# Patient Record
Sex: FEMALE | Race: OTHER | URBAN - METROPOLITAN AREA
[De-identification: names, ages, dates, MRNs, and addresses within clinical notes are randomized per-mention and may not be internally consistent; named-entity substitution may affect disease eponyms.]

---

## 2023-06-07 ENCOUNTER — INPATIENT (INPATIENT)
Facility: HOSPITAL | Age: 28
LOS: 2 days | Discharge: HOME HEALTH | DRG: 482 | End: 2023-06-10
Attending: ORTHOPAEDIC SURGERY | Admitting: ORTHOPAEDIC SURGERY
Payer: COMMERCIAL

## 2023-06-07 VITALS
TEMPERATURE: 98 F | OXYGEN SATURATION: 99 % | WEIGHT: 160.06 LBS | RESPIRATION RATE: 18 BRPM | SYSTOLIC BLOOD PRESSURE: 109 MMHG | HEIGHT: 68 IN | DIASTOLIC BLOOD PRESSURE: 65 MMHG | HEART RATE: 64 BPM

## 2023-06-07 DIAGNOSIS — S72.141A DISPLACED INTERTROCHANTERIC FRACTURE OF RIGHT FEMUR, INITIAL ENCOUNTER FOR CLOSED FRACTURE: ICD-10-CM

## 2023-06-07 DIAGNOSIS — F41.9 ANXIETY DISORDER, UNSPECIFIED: ICD-10-CM

## 2023-06-07 DIAGNOSIS — D72.829 ELEVATED WHITE BLOOD CELL COUNT, UNSPECIFIED: ICD-10-CM

## 2023-06-07 DIAGNOSIS — G43.909 MIGRAINE, UNSPECIFIED, NOT INTRACTABLE, WITHOUT STATUS MIGRAINOSUS: ICD-10-CM

## 2023-06-07 DIAGNOSIS — F32.A DEPRESSION, UNSPECIFIED: ICD-10-CM
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
## 2023-06-07 DIAGNOSIS — S72.001A FRACTURE OF UNSPECIFIED PART OF NECK OF RIGHT FEMUR, INITIAL ENCOUNTER FOR CLOSED FRACTURE: ICD-10-CM
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
## 2023-06-07 DIAGNOSIS — E28.2 POLYCYSTIC OVARIAN SYNDROME: ICD-10-CM
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
## 2023-06-07 DIAGNOSIS — E55.9 VITAMIN D DEFICIENCY, UNSPECIFIED: ICD-10-CM

## 2023-06-07 LAB
ANION GAP SERPL CALC-SCNC: 8 MMOL/L — SIGNIFICANT CHANGE UP (ref 5–17)
APTT BLD: 30.6 SEC — SIGNIFICANT CHANGE UP (ref 27.5–35.5)
BASOPHILS # BLD AUTO: 0.04 K/UL — SIGNIFICANT CHANGE UP (ref 0–0.2)
BASOPHILS NFR BLD AUTO: 0.4 % — SIGNIFICANT CHANGE UP (ref 0–2)
BLD GP AB SCN SERPL QL: NEGATIVE — SIGNIFICANT CHANGE UP
BLD GP AB SCN SERPL QL: NEGATIVE — SIGNIFICANT CHANGE UP
BUN SERPL-MCNC: 19 MG/DL — SIGNIFICANT CHANGE UP (ref 7–23)
CALCIUM SERPL-MCNC: 9.5 MG/DL — SIGNIFICANT CHANGE UP (ref 8.4–10.5)
CHLORIDE SERPL-SCNC: 106 MMOL/L — SIGNIFICANT CHANGE UP (ref 96–108)
CO2 SERPL-SCNC: 26 MMOL/L — SIGNIFICANT CHANGE UP (ref 22–31)
CREAT SERPL-MCNC: 0.67 MG/DL — SIGNIFICANT CHANGE UP (ref 0.5–1.3)
EGFR: 122 ML/MIN/1.73M2 — SIGNIFICANT CHANGE UP
EOSINOPHIL # BLD AUTO: 0.21 K/UL — SIGNIFICANT CHANGE UP (ref 0–0.5)
EOSINOPHIL NFR BLD AUTO: 1.9 % — SIGNIFICANT CHANGE UP (ref 0–6)
GLUCOSE SERPL-MCNC: 81 MG/DL — SIGNIFICANT CHANGE UP (ref 70–99)
HCG SERPL-ACNC: <0 MIU/ML — SIGNIFICANT CHANGE UP
HCG UR QL: NEGATIVE — SIGNIFICANT CHANGE UP
HCT VFR BLD CALC: 41.1 % — SIGNIFICANT CHANGE UP (ref 34.5–45)
HGB BLD-MCNC: 13.9 G/DL — SIGNIFICANT CHANGE UP (ref 11.5–15.5)
IMM GRANULOCYTES NFR BLD AUTO: 0.2 % — SIGNIFICANT CHANGE UP (ref 0–0.9)
INR BLD: 0.93 — SIGNIFICANT CHANGE UP (ref 0.88–1.16)
LYMPHOCYTES # BLD AUTO: 2.55 K/UL — SIGNIFICANT CHANGE UP (ref 1–3.3)
LYMPHOCYTES # BLD AUTO: 23 % — SIGNIFICANT CHANGE UP (ref 13–44)
MCHC RBC-ENTMCNC: 31.6 PG — SIGNIFICANT CHANGE UP (ref 27–34)
MCHC RBC-ENTMCNC: 33.8 GM/DL — SIGNIFICANT CHANGE UP (ref 32–36)
MCV RBC AUTO: 93.4 FL — SIGNIFICANT CHANGE UP (ref 80–100)
MONOCYTES # BLD AUTO: 0.57 K/UL — SIGNIFICANT CHANGE UP (ref 0–0.9)
MONOCYTES NFR BLD AUTO: 5.1 % — SIGNIFICANT CHANGE UP (ref 2–14)
NEUTROPHILS # BLD AUTO: 7.7 K/UL — HIGH (ref 1.8–7.4)
NEUTROPHILS NFR BLD AUTO: 69.4 % — SIGNIFICANT CHANGE UP (ref 43–77)
NRBC # BLD: 0 /100 WBCS — SIGNIFICANT CHANGE UP (ref 0–0)
PLATELET # BLD AUTO: 269 K/UL — SIGNIFICANT CHANGE UP (ref 150–400)
POTASSIUM SERPL-MCNC: 3.8 MMOL/L — SIGNIFICANT CHANGE UP (ref 3.5–5.3)
POTASSIUM SERPL-SCNC: 3.8 MMOL/L — SIGNIFICANT CHANGE UP (ref 3.5–5.3)
PROTHROM AB SERPL-ACNC: 11.1 SEC — SIGNIFICANT CHANGE UP (ref 10.5–13.4)
RBC # BLD: 4.4 M/UL — SIGNIFICANT CHANGE UP (ref 3.8–5.2)
RBC # FLD: 11.8 % — SIGNIFICANT CHANGE UP (ref 10.3–14.5)
RH IG SCN BLD-IMP: POSITIVE — SIGNIFICANT CHANGE UP
RH IG SCN BLD-IMP: POSITIVE — SIGNIFICANT CHANGE UP
SODIUM SERPL-SCNC: 140 MMOL/L — SIGNIFICANT CHANGE UP (ref 135–145)
WBC # BLD: 11.09 K/UL — HIGH (ref 3.8–10.5)
WBC # FLD AUTO: 11.09 K/UL — HIGH (ref 3.8–10.5)

## 2023-06-07 PROCEDURE — 71045 X-RAY EXAM CHEST 1 VIEW: CPT | Mod: 26

## 2023-06-07 PROCEDURE — 99285 EMERGENCY DEPT VISIT HI MDM: CPT

## 2023-06-07 PROCEDURE — 73552 X-RAY EXAM OF FEMUR 2/>: CPT | Mod: 26,RT

## 2023-06-07 PROCEDURE — 99252 IP/OBS CONSLTJ NEW/EST SF 35: CPT

## 2023-06-07 PROCEDURE — 73502 X-RAY EXAM HIP UNI 2-3 VIEWS: CPT | Mod: 26,RT

## 2023-06-07 RX ORDER — VERAPAMIL HCL 240 MG
180 CAPSULE, EXTENDED RELEASE PELLETS 24 HR ORAL
Refills: 0 | DISCHARGE

## 2023-06-07 RX ORDER — POVIDONE-IODINE 5 %
1 AEROSOL (ML) TOPICAL ONCE
Refills: 0 | Status: COMPLETED | OUTPATIENT
Start: 2023-06-08 | End: 2023-06-08

## 2023-06-07 RX ORDER — VERAPAMIL HCL 240 MG
180 CAPSULE, EXTENDED RELEASE PELLETS 24 HR ORAL AT BEDTIME
Refills: 0 | Status: DISCONTINUED | OUTPATIENT
Start: 2023-06-07 | End: 2023-06-10

## 2023-06-07 RX ORDER — SERTRALINE 25 MG/1
1 TABLET, FILM COATED ORAL
Refills: 0 | DISCHARGE

## 2023-06-07 RX ORDER — SODIUM CHLORIDE 9 MG/ML
1000 INJECTION, SOLUTION INTRAVENOUS
Refills: 0 | Status: DISCONTINUED | OUTPATIENT
Start: 2023-06-08 | End: 2023-06-10

## 2023-06-07 RX ORDER — OXYCODONE HYDROCHLORIDE 5 MG/1
5 TABLET ORAL EVERY 4 HOURS
Refills: 0 | Status: DISCONTINUED | OUTPATIENT
Start: 2023-06-08 | End: 2023-06-10

## 2023-06-07 RX ORDER — ACETAMINOPHEN 500 MG
650 TABLET ORAL EVERY 6 HOURS
Refills: 0 | Status: DISCONTINUED | OUTPATIENT
Start: 2023-06-08 | End: 2023-06-10

## 2023-06-07 RX ORDER — SERTRALINE 25 MG/1
200 TABLET, FILM COATED ORAL AT BEDTIME
Refills: 0 | Status: DISCONTINUED | OUTPATIENT
Start: 2023-06-07 | End: 2023-06-10

## 2023-06-07 RX ORDER — CHLORHEXIDINE GLUCONATE 213 G/1000ML
1 SOLUTION TOPICAL EVERY 12 HOURS
Refills: 0 | Status: COMPLETED | OUTPATIENT
Start: 2023-06-07 | End: 2023-06-08

## 2023-06-07 RX ADMIN — Medication 180 MILLIGRAM(S): at 22:59

## 2023-06-07 RX ADMIN — CHLORHEXIDINE GLUCONATE 1 APPLICATION(S): 213 SOLUTION TOPICAL at 21:53

## 2023-06-07 RX ADMIN — SERTRALINE 200 MILLIGRAM(S): 25 TABLET, FILM COATED ORAL at 21:53

## 2023-06-07 NOTE — H&P ADULT - PROBLEM SELECTOR PLAN 1
- Case reviewed and discussed with Dr. Oliveira  - Admit to Orthopaedic Service for elective Right DHS on 6/8/23  - Pt medically stable and cleared for procedure by Dr. Pham  - Hx of post-operative hemorrhage - monitor CBC closely, obtain post-op coags  - Pain control  - NPO after midnight  - WBS: NWB to RLE  - Dispo: OR 6/8

## 2023-06-07 NOTE — CONSULT NOTE ADULT - SUBJECTIVE AND OBJECTIVE BOX
27F w PCOS, Migraines, p/w pain in R hip after hiking 5/20, subsequent fall down stairs after being pulled by puppy, found to have closed R femoral neck fx, for R hip dynamic hip screw w Dr. Oliveira 6/8    Pt reports pain in R leg after hiking (not usually a hiker) on May 20th. She sustained multiple falls down the stairs (did not hit head or have LOC) while being pulled by her 70lb 13mo old pitbull puppy. Pain in leg worsened and pt had more difficulty ambulating. Pt seen at Urgent care and obtained referral to orthopedics. She was then seen at [facility] where pt reports work-up was negative. Eventually, pt had more difficulty walking - medicating w BID naproxen and presented to Dr. Oliveira's office where she was found to have a close R femoral neck fracture. Pt reports working out at the gym regularly - doing a lot of lower body exercises. Able to run and work out w/o chest pain, dyspnea, lightheadedness, dizziness. Denies any fever, nausea, abd pain, dysuria. Denies any diarrhea. Feels pain in hip is controlled when staying still but worsened when ambulating. Denies any numbness.     Does report h/o severe bleeding after tonsillectomy. Previously had appendectomy around age 5-6 wo bleeding complications afterward.     ROS: 12 point ROS reviewed and otherwise negative per HPI  FH: Uncle - osteosarcoma - passed away age 16. DM in both sides of family  SH: . 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 Previously had completed 1st two years of medical school. Non smoker. Social EtOH. Denies illicit substance use    PAST MEDICAL & SURGICAL HISTORY:    Home Meds: Home Medications:    Allergies: Allergies    No Known Allergies    Intolerances      Soc:   Advanced Directives: Presumed Full Code     CURRENT MEDICATIONS:   --------------------------------------------------------------------------------------  Neurologic Medications    Respiratory Medications    Cardiovascular Medications    Gastrointestinal Medications    Genitourinary Medications    Hematologic/Oncologic Medications    Antimicrobial/Immunologic Medications    Endocrine/Metabolic Medications    Topical/Other Medications    --------------------------------------------------------------------------------------    VITAL SIGNS, INS/OUTS (last 24 hours):  --------------------------------------------------------------------------------------  ICU Vital Signs Last 24 Hrs  T(C): 36.8 (07 Jun 2023 09:51), Max: 36.8 (07 Jun 2023 09:51)  T(F): 98.2 (07 Jun 2023 09:51), Max: 98.2 (07 Jun 2023 09:51)  HR: 64 (07 Jun 2023 09:51) (64 - 64)  BP: 109/65 (07 Jun 2023 09:51) (109/65 - 109/65)  BP(mean): --  ABP: --  ABP(mean): --  RR: 18 (07 Jun 2023 09:51) (18 - 18)  SpO2: 99% (07 Jun 2023 09:51) (99% - 99%)    O2 Parameters below as of 07 Jun 2023 09:51  Patient On (Oxygen Delivery Method): room air          I&O's Summary    --------------------------------------------------------------------------------------    EXAM:  GEN: female in NAD on RA  HEENT: NC/AT, MMM  CV: RRR, nml S1S2, no murmurs  PULM: nml effort, CTAB  ABD: Soft, non-distended, NABS, non-tender  NEURO  A/O x3, moving all extremities, Sensation intact  R hip strength limited by pain. 5/5 in plantarflex/ext b/l   PSYCH: Appropriate      LABS  --------------------------------------------------------------------------------------  Labs:  CAPILLARY BLOOD GLUCOSE                              13.9   11.09 )-----------( 269      ( 07 Jun 2023 10:28 )             41.1       Auto Neutrophil %: 69.4 % (06-07-23 @ 10:28)  Auto Immature Granulocyte %: 0.2 % (06-07-23 @ 10:28)    06-07    140  |  106  |  19  ----------------------------<  81  3.8   |  26  |  0.67      Calcium, Total Serum: 9.5 mg/dL (06-07-23 @ 10:28)      LFTs:         Coags:     11.1   ----< 0.93    ( 07 Jun 2023 10:28 )     30.6                      --------------------------------------------------------------------------------------    OTHER LABS    IMAGING RESULTS  ****************

## 2023-06-07 NOTE — ED PROVIDER NOTE - WR ORDER NAME 2
----- Message from Romel Vinnie sent at 9/13/2022  4:18 PM EDT -----  Subject: Message to Provider    QUESTIONS  Information for Provider? Madalyn Cuevasos is calling to get verbal orders for a   speech therapy order and then needs an updated barium swallow study. That   is her direct number, can leave a VM if needed, thank you  ---------------------------------------------------------------------------  --------------  CALL BACK INFO  [phone]; OK to leave message on voicemail  ---------------------------------------------------------------------------  --------------  SCRIPT ANSWERS  Relationship to Patient? Third Party  Third Party Type? Home Health Care? Representative Name?  [facility]
This is fine. Thanks.   Katie Ordoñez
Verbal order given.
Xray Femur 2 Views, Right

## 2023-06-07 NOTE — ED ADULT NURSE NOTE - NSFALLRISKINTERV_ED_ALL_ED

## 2023-06-07 NOTE — PATIENT PROFILE ADULT - FALL HARM RISK - RISK INTERVENTIONS

## 2023-06-07 NOTE — ED PROVIDER NOTE - OBJECTIVE STATEMENT
28 y/o F with PMHx PCOS and migraines presents to ED c/o right sided closed femoral neck fracture. Pt states 2 weeks ago she started having right sided hip pain. She is unable to recall a specific traumatic event. Pt reports having a 13 month old puppy that is strong while walking and has caused her to fall down the stairs a few times. Pt began having increasing pain to her right hip with inability to walk so she has been hopping around on one foot. She went to outside hospital where she had XRs that were reportedly negative and was discharged. Pt continued to have pain so she saw an orthopedist and had a CT that showed a closed femoral neck fracture. Pt was referred to ED for operative repair.

## 2023-06-07 NOTE — ED ADULT NURSE NOTE - OBJECTIVE STATEMENT
27y F hx PCOS, tonsillectomy, c/o R hip pain s/p fracture. Sent by MD Oliveira for surgery schedule 6/8. Pt states she fell down the stairs 2weeks ago, but is unsure when injury exactly happened. Fracture confirmed on outpatient radiology scans. Endorses severe R hip pain with activity, non-weight bearing R side, and moderate aching pain at rest. Denies numbness/tingling of affected side, CP/SOB, leg swelling, presence of other pain. Pulses equal and strong bilaterally, strength limited on affected side. Pt prescribed naproxen 2x daily for pain, ambulating with crutchers at home. Pt states she has hx of post-op hemorrhage, denies AC use, has not f/u with hematology since first incident. Pt AAOx4, speaking in full and clear sentences, in no apparent distress at this time.

## 2023-06-07 NOTE — H&P ADULT - NSHPPHYSICALEXAM_GEN_ALL_CORE
Gen: Alert and oriented, NAD  MSK: No evidence of deformity or open wound  2+ DP pulses palpable bilaterally. Skin wwp. Cap refill brisk.   SILT to bilateral lower extremities  EHL/FHL/TA/GS 5/5 bilaterally  Active right hip flex/ext limited 2/2 pain. Full Passive ROM to right hip. FROM to left hip, bilateral knees

## 2023-06-07 NOTE — CONSULT NOTE ADULT - ASSESSMENT
PCP: Dr. Miguelina Hwang - [facility]  27F w PCOS, Migraines, p/w pain in R hip after hiking 5/20, subsequent fall down stairs after being pulled by puppy, found to have closed R femoral neck fx, for R hip dynamic hip screw w Dr. Oliveira 6/8    #Pre-op evaluation  METS:  >4  EKG: NSR wo ST/T wave changes  RCRI: Class I risk  THOMPSON: <0.1% risk of MI or cardiac arrest intraop or up to 30d post-op    #R femoral neck fx - mgmt per orthopedics  #Migraines - on verapamil 180mg HS  #Depression - home on zoloft 200mg HS  #Leukocytosis - 11. Likely reactive. Afebrile and nontoxic appearing.     Recommmend  Overall, pt has good functional status and is low risk for intermediate risk procedure. Optimized for OR.     Would restart PPx as soon as post-op bleeding risk allows    DISPO: TBD - pending OR, PT post-op. Pt lives in Walk-up apartment 3rd floor

## 2023-06-07 NOTE — ED ADULT TRIAGE NOTE - CHIEF COMPLAINT QUOTE
Pt aaox3 c/o right hip pain. Pt has known hip fracture s/p fall down stairs 2x wks ago. Pt has outpatient CT which confirmed fracture and was sent by MD Oliveira for admission for surgery. Pt taking Naproxen 500mg 2x a day. Pt ambulating with crutches. Pt has + sensation to the right lower extremity, denies any numbness weakness, CP, or SOB.

## 2023-06-07 NOTE — H&P ADULT - HISTORY OF PRESENT ILLNESS
26yo female w/ PMHx PCOS, migraine, anxiety, depression presenting w/ right hip pain x 2 weeks s/p fall down the stairs. She states she first felt pain in her right hip when she went hiking a couple weeks ago. Since then she noticed herself limping and then had a fall down the stairs which exacerbated the pain greatly.  28yo female w/ PMHx PCOS, migraine, anxiety, depression presenting w/ right hip pain x 2 weeks s/p fall down the stairs. She states she first felt pain in her right hip when she went hiking a couple weeks ago. Since then she noticed herself limping and then had a fall down the stairs which exacerbated the pain greatly. She states she went to urgent care on 5/29 where they sent her home w/ outpatient ortho follow up. The next day she reports she wasn't able to move because of the pain. She then saw an outpatient orthopedist who got an xray which showed a right femoral neck fracture. He referred her to Dr. Oliveira who sent her to [facility] ED for surgical intervention. She states she has been ambulating with crutches and cannot bear weight on her right leg given the pain. She notes intermittent tingling to the extremity. She has been taking Naproxen for pain w/ some relief of symptoms. Of note, she states she has a history of post-operative hemorrhage following a tonsillectomy 10 years ago but has not had a heme workup.     Denies fevers/chills/CP/SOB/N/V

## 2023-06-07 NOTE — ED PROVIDER NOTE - CLINICAL SUMMARY MEDICAL DECISION MAKING FREE TEXT BOX
Plan for ortho consult and preop work up with labs, EKG, and CXR. Hip imaging as per ortho recommendations.

## 2023-06-07 NOTE — ED PROVIDER NOTE - PHYSICAL EXAMINATION
CONST: nontoxic NAD speaking in full sentences  HEAD: atraumatic  EYES: conjunctivae clear, PERRL, EOMI  ENT: mmm  NECK: supple/FROM  CARD: rrr  CHEST: no stridor/retractions/tripoding  ABD: soft, nd, nttp, no rebound/guarding  EXT: +tenderness over right upper hip, good distal pulses, wiggling toes   SKIN: warm, dry, no rash  NEURO: a+ox3, 5/5 strength x4, gross sensation intact x4, baseline gait

## 2023-06-08 LAB
24R-OH-CALCIDIOL SERPL-MCNC: 22.8 NG/ML — LOW (ref 30–80)
ALBUMIN SERPL ELPH-MCNC: 3.9 G/DL — SIGNIFICANT CHANGE UP (ref 3.3–5)
APTT BLD: 30.3 SEC — SIGNIFICANT CHANGE UP (ref 27.5–35.5)
CALCIUM SERPL-MCNC: 8.9 MG/DL — SIGNIFICANT CHANGE UP (ref 8.4–10.5)
HCT VFR BLD CALC: 43.1 % — SIGNIFICANT CHANGE UP (ref 34.5–45)
HGB BLD-MCNC: 14.9 G/DL — SIGNIFICANT CHANGE UP (ref 11.5–15.5)
INR BLD: 1.03 — SIGNIFICANT CHANGE UP (ref 0.88–1.16)
MCHC RBC-ENTMCNC: 31.2 PG — SIGNIFICANT CHANGE UP (ref 27–34)
MCHC RBC-ENTMCNC: 34.6 GM/DL — SIGNIFICANT CHANGE UP (ref 32–36)
MCV RBC AUTO: 90.4 FL — SIGNIFICANT CHANGE UP (ref 80–100)
NRBC # BLD: 0 /100 WBCS — SIGNIFICANT CHANGE UP (ref 0–0)
PLATELET # BLD AUTO: 292 K/UL — SIGNIFICANT CHANGE UP (ref 150–400)
PROTHROM AB SERPL-ACNC: 12.3 SEC — SIGNIFICANT CHANGE UP (ref 10.5–13.4)
RBC # BLD: 4.77 M/UL — SIGNIFICANT CHANGE UP (ref 3.8–5.2)
RBC # FLD: 11.5 % — SIGNIFICANT CHANGE UP (ref 10.3–14.5)
TSH SERPL-MCNC: 1.3 UIU/ML — SIGNIFICANT CHANGE UP (ref 0.27–4.2)
WBC # BLD: 13.07 K/UL — HIGH (ref 3.8–10.5)
WBC # FLD AUTO: 13.07 K/UL — HIGH (ref 3.8–10.5)

## 2023-06-08 PROCEDURE — 99232 SBSQ HOSP IP/OBS MODERATE 35: CPT | Mod: GC

## 2023-06-08 DEVICE — GUIDEWIRE OMEGA GUIDE PIN COCR THREADED TIP 2.8MM X 230MM: Type: IMPLANTABLE DEVICE | Status: FUNCTIONAL

## 2023-06-08 DEVICE — IMPLANTABLE DEVICE: Type: IMPLANTABLE DEVICE | Status: FUNCTIONAL

## 2023-06-08 RX ORDER — CEFAZOLIN SODIUM 1 G
2000 VIAL (EA) INJECTION EVERY 8 HOURS
Refills: 0 | Status: COMPLETED | OUTPATIENT
Start: 2023-06-08 | End: 2023-06-09

## 2023-06-08 RX ORDER — HYDROMORPHONE HYDROCHLORIDE 2 MG/ML
0.5 INJECTION INTRAMUSCULAR; INTRAVENOUS; SUBCUTANEOUS EVERY 4 HOURS
Refills: 0 | Status: DISCONTINUED | OUTPATIENT
Start: 2023-06-08 | End: 2023-06-08

## 2023-06-08 RX ORDER — ONDANSETRON 8 MG/1
4 TABLET, FILM COATED ORAL EVERY 6 HOURS
Refills: 0 | Status: DISCONTINUED | OUTPATIENT
Start: 2023-06-08 | End: 2023-06-10

## 2023-06-08 RX ORDER — ACETAMINOPHEN 500 MG
1000 TABLET ORAL ONCE
Refills: 0 | Status: DISCONTINUED | OUTPATIENT
Start: 2023-06-08 | End: 2023-06-09

## 2023-06-08 RX ORDER — ACETAMINOPHEN 500 MG
1000 TABLET ORAL ONCE
Refills: 0 | Status: COMPLETED | OUTPATIENT
Start: 2023-06-08 | End: 2023-06-08

## 2023-06-08 RX ORDER — FENTANYL CITRATE 50 UG/ML
50 INJECTION INTRAVENOUS
Refills: 0 | Status: DISCONTINUED | OUTPATIENT
Start: 2023-06-08 | End: 2023-06-09

## 2023-06-08 RX ORDER — MORPHINE SULFATE 50 MG/1
2 CAPSULE, EXTENDED RELEASE ORAL EVERY 4 HOURS
Refills: 0 | Status: DISCONTINUED | OUTPATIENT
Start: 2023-06-08 | End: 2023-06-10

## 2023-06-08 RX ORDER — ASPIRIN/CALCIUM CARB/MAGNESIUM 324 MG
325 TABLET ORAL DAILY
Refills: 0 | Status: DISCONTINUED | OUTPATIENT
Start: 2023-06-08 | End: 2023-06-10

## 2023-06-08 RX ADMIN — Medication 650 MILLIGRAM(S): at 21:33

## 2023-06-08 RX ADMIN — SERTRALINE 200 MILLIGRAM(S): 25 TABLET, FILM COATED ORAL at 22:56

## 2023-06-08 RX ADMIN — FENTANYL CITRATE 50 MICROGRAM(S): 50 INJECTION INTRAVENOUS at 18:24

## 2023-06-08 RX ADMIN — SODIUM CHLORIDE 100 MILLILITER(S): 9 INJECTION, SOLUTION INTRAVENOUS at 05:03

## 2023-06-08 RX ADMIN — Medication 1000 MILLIGRAM(S): at 13:20

## 2023-06-08 RX ADMIN — Medication 100 MILLIGRAM(S): at 23:18

## 2023-06-08 RX ADMIN — OXYCODONE HYDROCHLORIDE 5 MILLIGRAM(S): 5 TABLET ORAL at 18:44

## 2023-06-08 RX ADMIN — Medication 180 MILLIGRAM(S): at 22:56

## 2023-06-08 RX ADMIN — Medication 1 APPLICATION(S): at 13:05

## 2023-06-08 RX ADMIN — CHLORHEXIDINE GLUCONATE 1 APPLICATION(S): 213 SOLUTION TOPICAL at 11:34

## 2023-06-08 RX ADMIN — Medication 650 MILLIGRAM(S): at 06:04

## 2023-06-08 RX ADMIN — Medication 650 MILLIGRAM(S): at 22:33

## 2023-06-08 RX ADMIN — Medication 650 MILLIGRAM(S): at 05:04

## 2023-06-08 RX ADMIN — Medication 400 MILLIGRAM(S): at 13:05

## 2023-06-08 NOTE — PROGRESS NOTE ADULT - SUBJECTIVE AND OBJECTIVE BOX
Ortho Note    Subjective:  Pt comfortable without complaints, pain controlled with current pain medication regimen   Denies CP, SOB, N/V, numbness/tingling   Discussed plan of care with patient at bedside - patient NPO for OR today       Vital Signs Last 24 Hrs  T(C): 37.1 (06-08-23 @ 08:30), Max: 37.1 (06-08-23 @ 08:30)  T(F): 98.7 (06-08-23 @ 08:30), Max: 98.7 (06-08-23 @ 08:30)  HR: 61 (06-08-23 @ 08:30) (61 - 61)  BP: 105/64 (06-08-23 @ 08:30) (105/64 - 105/64)  BP(mean): --  RR: 17 (06-08-23 @ 08:30) (17 - 17)  SpO2: 97% (06-08-23 @ 08:30) (97% - 97%)  AVSS    Objective:    Physical Exam:  General: Pt Alert and oriented, NAD  Pulses: +2 pedal pulses, wwp toes  Sensation: silt intact bilateral lower extremities  Motor: EHL/FHL/TA/GS - 5/5 bilateral lower extremities        Plan of Care:  A/P: 27yFemale s/p fracture of the femoral neck closed- for OR today   - afebrile  - Pain Control- tylenol 650mg PO Q6h prn mild pain, Oxycodone 5mg PO Q4h prn severe pain,    - DVT ppx: scds- held for OR  - PT, WBS: FILIBERTO RLE  - [facility] medicine recs and clearance  - NPO for OR 6-8-2023  - Dispo- OR today     Ortho Pager [phone]

## 2023-06-08 NOTE — PRE-ANESTHESIA EVALUATION ADULT - NSANTHPMHFT_GEN_ALL_CORE
26yo female w/ PMHx PCOS, migraine, anxiety, depression presenting w/ right hip pain x 2 weeks s/p fall down the stairs. She states she first felt pain in her right hip when she went hiking a couple weeks ago. Since then she noticed herself limping and then had a fall down the stairs which exacerbated the pain greatly. She states she went to urgent care on 5/29 where they sent her home w/ outpatient ortho follow up. The next day she reports she wasn't able to move because of the pain. She then saw an outpatient orthopedist who got an xray which showed a right femoral neck fracture. He referred her to Dr. Oliveira who sent her to [facility] ED for surgical intervention. She states she has been ambulating with crutches and cannot bear weight on her right leg given the pain. She notes intermittent tingling to the extremity. She has been taking Naproxen for pain w/ some relief of symptoms. Of note, she states she has a history of post-operative hemorrhage following a tonsillectomy 10 years ago but has not had a heme workup.

## 2023-06-08 NOTE — PROGRESS NOTE ADULT - SUBJECTIVE AND OBJECTIVE BOX
SUBJECTIVE: Pt seen and examined on morning rounds.   Denies CP, SOB, N/V, new onset motor/sensory deficits    Vital Signs Last 24 Hrs  T(C): 36.7 (08 Jun 2023 05:07), Max: 36.9 (07 Jun 2023 15:57)  T(F): 98 (08 Jun 2023 05:07), Max: 98.5 (07 Jun 2023 15:57)  HR: 76 (08 Jun 2023 05:07) (64 - 82)  BP: 101/66 (08 Jun 2023 05:07) (98/60 - 109/65)  BP(mean): --  RR: 18 (08 Jun 2023 05:07) (17 - 18)  SpO2: 98% (08 Jun 2023 05:07) (98% - 100%)    Parameters below as of 08 Jun 2023 05:07  Patient On (Oxygen Delivery Method): room air          Physical Exam: Gen: Alert and oriented, NAD  MSK: No evidence of deformity or open wound  2+ DP pulses palpable bilaterally. Skin wwp. Cap refill brisk.   SILT to bilateral lower extremities  EHL/FHL/TA/GS 5/5 bilaterally  Active right hip flex/ext limited 2/2 pain. Full Passive ROM to right hip. FROM to left hip, bilateral knees      Labs:                        13.9   11.09 )-----------( 269      ( 07 Jun 2023 10:28 )             41.1     07 Jun 2023 10:28    140    |  106    |  19     ----------------------------<  81     3.8     |  26     |  0.67     Ca    9.5        07 Jun 2023 10:28      PT/INR - ( 07 Jun 2023 10:28 )   PT: 11.1 sec;   INR: 0.93          PTT - ( 07 Jun 2023 10:28 )  PTT:30.6 sec      Assessment/Plan:  27yF w R FNF, plan for R hip RHS by Dr. DEVIN Oliveira-Sarah on 06-08   - Weight Bearing Status: NWB RLE   - Pain control  - DVT PPx: SCD  - NPO for OR   - F/u AM labs    Aman Middleton, PGY-2  Orthopedic Surgery

## 2023-06-08 NOTE — PRE-ANESTHESIA EVALUATION ADULT - NSANTHOSAYNRD_GEN_A_CORE
No. BIJAL screening performed.  STOP BANG Legend: 0-2 = LOW Risk; 3-4 = INTERMEDIATE Risk; 5-8 = HIGH Risk

## 2023-06-08 NOTE — PROGRESS NOTE ADULT - SUBJECTIVE AND OBJECTIVE BOX
Ortho Post Op Check    Procedure: left hip DHS  Surgeon: Dr. Oliveira    Pt endorses new tingling in   Denies CP, SOB, N/V, numbness/tingling     Vital Signs Last 24 Hrs  T(C): 36.6 (06-08-23 @ 18:41), Max: 37.1 (06-08-23 @ 14:31)  T(F): 97.8 (06-08-23 @ 18:41), Max: 97.8 (06-08-23 @ 18:41)  HR: 58 (06-08-23 @ 18:55) (54 - 78)  BP: 111/66 (06-08-23 @ 18:41) (105/64 - 120/71)  BP(mean): 84 (06-08-23 @ 18:41) (83 - 90)  RR: 15 (06-08-23 @ 18:55) (12 - 19)  SpO2: 99% (06-08-23 @ 18:55) (97% - 100%)  I&O's Summary    07 Jun 2023 07:01  -  08 Jun 2023 07:00  --------------------------------------------------------  IN: 240 mL / OUT: 600 mL / NET: -360 mL        General: Pt Alert and oriented, NAD  DSG C/D/I  Pulses:  Sensation:  Motor: EHL/FHL/TA/GS                          14.9   13.07 )-----------( 292      ( 08 Jun 2023 18:15 )             43.1     06-07    140  |  106  |  19  ----------------------------<  81  3.8   |  26  |  0.67    Ca    8.9      08 Jun 2023 05:30    TPro  x   /  Alb  3.9  /  TBili  x   /  DBili  x   /  AST  x   /  ALT  x   /  AlkPhos  x   06-08      Post-op X-Ray:    A/P: 27yFemale POD#0 s/p   - Stable  - Pain Control  - DVT ppx:  - Post op abx:  - PT, WBS:     Ortho Pager [phone] Ortho Post Op Check    Procedure: left hip DHS  Surgeon: Dr. Oliveira    Pt endorses new tingling in RLE foot  Denies CP, SOB, N/V    Vital Signs Last 24 Hrs  T(C): 36.6 (06-08-23 @ 18:41), Max: 37.1 (06-08-23 @ 14:31)  T(F): 97.8 (06-08-23 @ 18:41), Max: 97.8 (06-08-23 @ 18:41)  HR: 58 (06-08-23 @ 18:55) (54 - 78)  BP: 111/66 (06-08-23 @ 18:41) (105/64 - 120/71)  BP(mean): 84 (06-08-23 @ 18:41) (83 - 90)  RR: 15 (06-08-23 @ 18:55) (12 - 19)  SpO2: 99% (06-08-23 @ 18:55) (97% - 100%)  I&O's Summary    07 Jun 2023 07:01  -  08 Jun 2023 07:00  --------------------------------------------------------  IN: 240 mL / OUT: 600 mL / NET: -360 mL        General: Pt Alert and oriented, NAD  DSG C/D/I aquacell  B/L LE: DP 2+, brisk cap refill, EHL/FHL/TA/GS firing bilaterally                              14.9   13.07 )-----------( 292      ( 08 Jun 2023 18:15 )             43.1     06-07    140  |  106  |  19  ----------------------------<  81  3.8   |  26  |  0.67    Ca    8.9      08 Jun 2023 05:30    TPro  x   /  Alb  3.9  /  TBili  x   /  DBili  x   /  AST  x   /  ALT  x   /  AlkPhos  x   06-08      Post-op X-Ray: dhs in place, no new fxs    A/P: 27yFemale POD#0 s/p DHS  - Stable  -monitor neuro exam sensation in bilateral le's  - Pain Control  - DVT ppx: asa 325 q24  - Post op abx: ancef  - PT, WBS: 50% PWB    Ortho Pager [phone] Ortho Post Op Check    Procedure: left hip DHS  Surgeon: Dr. Oliveira    Pt endorses new tingling in RLE foot  Denies CP, SOB, N/V    Vital Signs Last 24 Hrs  T(C): 36.6 (06-08-23 @ 18:41), Max: 37.1 (06-08-23 @ 14:31)  T(F): 97.8 (06-08-23 @ 18:41), Max: 97.8 (06-08-23 @ 18:41)  HR: 58 (06-08-23 @ 18:55) (54 - 78)  BP: 111/66 (06-08-23 @ 18:41) (105/64 - 120/71)  BP(mean): 84 (06-08-23 @ 18:41) (83 - 90)  RR: 15 (06-08-23 @ 18:55) (12 - 19)  SpO2: 99% (06-08-23 @ 18:55) (97% - 100%)  I&O's Summary    07 Jun 2023 07:01  -  08 Jun 2023 07:00  --------------------------------------------------------  IN: 240 mL / OUT: 600 mL / NET: -360 mL        General: Pt Alert and oriented, NAD  DSG C/D/I aquacell  B/L LE: DP 2+, brisk cap refill, EHL/FHL/TA/GS firing bilaterally                            14.9   13.07 )-----------( 292      ( 08 Jun 2023 18:15 )             43.1     06-07    140  |  106  |  19  ----------------------------<  81  3.8   |  26  |  0.67    Ca    8.9      08 Jun 2023 05:30    TPro  x   /  Alb  3.9  /  TBili  x   /  DBili  x   /  AST  x   /  ALT  x   /  AlkPhos  x   06-08      Post-op X-Ray: dhs in place, no new fxs    A/P: 27yFemale POD#0 s/p DHS  - Stable  -monitor neuro exam sensation in bilateral le's-placed hip in position of extension, knee in flexion  - Pain Control  - DVT ppx: asa 325 q24  - Post op abx: ancef  - PT, WBS: 50% PWB    Ortho Pager [phone]

## 2023-06-08 NOTE — PROGRESS NOTE ADULT - SUBJECTIVE AND OBJECTIVE BOX
INTERVAL HPI/OVERNIGHT EVENTS: edison o/n    SUBJECTIVE: Patient seen and examined at bedside.   Pt reports decreased sensation in R leg (calf > thighs) which has been present since falls. Also reports being told of having a slipped disk by orthopedist outpatient. Otherwise pain is controlled in groin and R hip. No chest pain, dyspnea.   For OR today      OBJECTIVE:    VITAL SIGNS:  ICU Vital Signs Last 24 Hrs  T(C): 37.1 (08 Jun 2023 14:31), Max: 37.1 (08 Jun 2023 08:30)  T(F): 98.7 (08 Jun 2023 08:30), Max: 98.7 (08 Jun 2023 08:30)  HR: 61 (08 Jun 2023 14:31) (61 - 82)  BP: 105/64 (08 Jun 2023 14:31) (98/60 - 108/77)  BP(mean): --  ABP: --  ABP(mean): --  RR: 17 (08 Jun 2023 14:31) (17 - 18)  SpO2: 97% (08 Jun 2023 14:31) (97% - 100%)    O2 Parameters below as of 08 Jun 2023 08:30  Patient On (Oxygen Delivery Method): room air              06-07 @ 07:01  -  06-08 @ 07:00  --------------------------------------------------------  IN: 240 mL / OUT: 600 mL / NET: -360 mL      CAPILLARY BLOOD GLUCOSE          PHYSICAL EXAM:  GEN: female in NAD on RA  HEENT: NC/AT, MMM  CV: RRR, nml S1S2, no murmurs  PULM: nml effort, CTAB  ABD: Soft, non-distended, NABS, non-tender  NEURO  A/O x3, moving all extremities, Some decreased strength in R leg > L  R hip strength limited by pain. 5/5 in plantarflex/ext b/l   PSYCH: Appropriate    MEDICATIONS:  MEDICATIONS  (STANDING):  lactated ringers. 1000 milliLiter(s) (100 mL/Hr) IV Continuous <Continuous>  sertraline 200 milliGRAM(s) Oral at bedtime  verapamil  milliGRAM(s) Oral at bedtime    MEDICATIONS  (PRN):  acetaminophen     Tablet .. 650 milliGRAM(s) Oral every 6 hours PRN Temp greater or equal to 38C (100.4F), Mild Pain (1 - 3)  oxyCODONE    IR 5 milliGRAM(s) Oral every 4 hours PRN Severe Pain (7 - 10)      ALLERGIES:  Allergies    No Known Allergies    Intolerances        LABS:                        13.9   11.09 )-----------( 269      ( 07 Jun 2023 10:28 )             41.1     06-07    140  |  106  |  19  ----------------------------<  81  3.8   |  26  |  0.67    Ca    8.9      08 Jun 2023 05:30    TPro  x   /  Alb  3.9  /  TBili  x   /  DBili  x   /  AST  x   /  ALT  x   /  AlkPhos  x   06-08    PT/INR - ( 07 Jun 2023 10:28 )   PT: 11.1 sec;   INR: 0.93          PTT - ( 07 Jun 2023 10:28 )  PTT:30.6 sec      RADIOLOGY & ADDITIONAL TESTS: Reviewed.

## 2023-06-08 NOTE — BRIEF OPERATIVE NOTE - NSICDXBRIEFPROCEDURE_GEN_ALL_CORE_FT
PROCEDURES:  ORIF, fracture, femur, neck, using sliding hip screw or compression hip screw 08-Jun-2023 16:55:35  Savanna Johnson

## 2023-06-09 LAB
ANION GAP SERPL CALC-SCNC: 13 MMOL/L — SIGNIFICANT CHANGE UP (ref 5–17)
BUN SERPL-MCNC: 10 MG/DL — SIGNIFICANT CHANGE UP (ref 7–23)
CALCIUM SERPL-MCNC: 9.5 MG/DL — SIGNIFICANT CHANGE UP (ref 8.4–10.5)
CHLORIDE SERPL-SCNC: 104 MMOL/L — SIGNIFICANT CHANGE UP (ref 96–108)
CO2 SERPL-SCNC: 21 MMOL/L — LOW (ref 22–31)
CREAT SERPL-MCNC: 0.55 MG/DL — SIGNIFICANT CHANGE UP (ref 0.5–1.3)
EGFR: 128 ML/MIN/1.73M2 — SIGNIFICANT CHANGE UP
GLUCOSE SERPL-MCNC: 94 MG/DL — SIGNIFICANT CHANGE UP (ref 70–99)
HCT VFR BLD CALC: 39.6 % — SIGNIFICANT CHANGE UP (ref 34.5–45)
HGB BLD-MCNC: 13.5 G/DL — SIGNIFICANT CHANGE UP (ref 11.5–15.5)
MCHC RBC-ENTMCNC: 31.8 PG — SIGNIFICANT CHANGE UP (ref 27–34)
MCHC RBC-ENTMCNC: 34.1 GM/DL — SIGNIFICANT CHANGE UP (ref 32–36)
MCV RBC AUTO: 93.4 FL — SIGNIFICANT CHANGE UP (ref 80–100)
NRBC # BLD: 0 /100 WBCS — SIGNIFICANT CHANGE UP (ref 0–0)
PLATELET # BLD AUTO: 224 K/UL — SIGNIFICANT CHANGE UP (ref 150–400)
POTASSIUM SERPL-MCNC: SIGNIFICANT CHANGE UP MMOL/L (ref 3.5–5.3)
POTASSIUM SERPL-SCNC: SIGNIFICANT CHANGE UP MMOL/L (ref 3.5–5.3)
RBC # BLD: 4.24 M/UL — SIGNIFICANT CHANGE UP (ref 3.8–5.2)
RBC # FLD: 11.7 % — SIGNIFICANT CHANGE UP (ref 10.3–14.5)
SODIUM SERPL-SCNC: 138 MMOL/L — SIGNIFICANT CHANGE UP (ref 135–145)
WBC # BLD: 17.78 K/UL — HIGH (ref 3.8–10.5)
WBC # FLD AUTO: 17.78 K/UL — HIGH (ref 3.8–10.5)

## 2023-06-09 PROCEDURE — 99233 SBSQ HOSP IP/OBS HIGH 50: CPT

## 2023-06-09 RX ADMIN — OXYCODONE HYDROCHLORIDE 5 MILLIGRAM(S): 5 TABLET ORAL at 00:16

## 2023-06-09 RX ADMIN — Medication 650 MILLIGRAM(S): at 17:46

## 2023-06-09 RX ADMIN — OXYCODONE HYDROCHLORIDE 5 MILLIGRAM(S): 5 TABLET ORAL at 18:38

## 2023-06-09 RX ADMIN — Medication 650 MILLIGRAM(S): at 08:06

## 2023-06-09 RX ADMIN — OXYCODONE HYDROCHLORIDE 5 MILLIGRAM(S): 5 TABLET ORAL at 01:16

## 2023-06-09 RX ADMIN — Medication 650 MILLIGRAM(S): at 09:06

## 2023-06-09 RX ADMIN — Medication 180 MILLIGRAM(S): at 21:44

## 2023-06-09 RX ADMIN — SERTRALINE 200 MILLIGRAM(S): 25 TABLET, FILM COATED ORAL at 21:44

## 2023-06-09 RX ADMIN — Medication 325 MILLIGRAM(S): at 12:00

## 2023-06-09 RX ADMIN — Medication 100 MILLIGRAM(S): at 07:00

## 2023-06-09 RX ADMIN — OXYCODONE HYDROCHLORIDE 5 MILLIGRAM(S): 5 TABLET ORAL at 19:38

## 2023-06-09 RX ADMIN — Medication 650 MILLIGRAM(S): at 18:47

## 2023-06-09 NOTE — DISCHARGE NOTE PROVIDER - NSDCCPCAREPLAN_GEN_ALL_CORE_FT
PRINCIPAL DISCHARGE DIAGNOSIS  Diagnosis: Fracture of femoral neck, closed  Assessment and Plan of Treatment: s/p Right DHS      SECONDARY DISCHARGE DIAGNOSES  Diagnosis: Right hip pain  Assessment and Plan of Treatment:

## 2023-06-09 NOTE — DISCHARGE NOTE PROVIDER - NSDCFUADDINST_GEN_ALL_CORE_FT
Please follow Dr. Aguirre’s instruction sheets     ACTIVITY:   - Weight bear as tolerated with assistive device. No strenuous activity, heavy lifting, driving or returning to work until cleared by MD.   - Apply a cold compress to the surgical site several times daily to reduce pain and swelling. For icing, twenty-minute sessions followed by an hour off is recommended. You should ice as frequently as possible. Ice should NEVER be placed directly on the skin. Wearing compression stockings during the first week after surgery can help reduce swelling in your knee, calf and foot, but is not required.        DRESSING/SHOWERING:   (AQUACEL – brown gel dressing)   - You may shower, your dressing is water-resistant. Do not soak in bathtubs. Remove dressing after postop day 7, then leave incision open to air. You may do this yourself (simply peel it off), or you may ask for assistance from your visiting nurse. Keep your incision clean and dry. Do not pick at your incision. Do not apply creams, ointments or oils to your incision until cleared by your surgeon. Do not soak your incision in sitting water (ie tubs, pools, lakes, etc.) until cleared by your surgeon. Do not scrub the incision – instead, allow soap and water to flow over the incision and then pat it dry with a clean towel.       MEDICATION/ANTICOAGULATION:   -You have been prescribed aspirin, as a preventative to help prevent postoperative blood clots. Please take this medication as prescribed.    - You have been prescribed medications for pain:     - Tylenol for mild to moderate pain. Do not exceed 3,000mg daily.     - For more severe pain, take Tylenol with the addition of narcotic pain medication. Take this medication as prescribed. This medication may cause drowsiness or dizziness. Do not operate machinery. This medication may cause constipation.   - For any additional medications, follow instructions on the bottle.    -Try to have regular bowel movements. Take stool softener or laxative if necessary. You may wish to take Miralax daily until you have regular bowel movements.    - If you have been prescribed Aspirin or an anti-inflammatory, please take prilosec (omeprazole) once a day, before breakfast, until no longer taking Aspirin or anti-inflammatory. This will help protect your stomach.   - If you have a pain management physician, please follow-up with them postoperatively.    - If you experience any negative side effects of your medications, please call your surgeon's office to discuss.      FOLLOW-UP:   - Call to schedule an appt with Dr. Aguirre  for follow up.   - Please follow-up with your primary care physician or any other specialist you see postoperatively, if needed.    - Contact your doctor or go to the emergency room if you experience: fever greater than 101.5, chills, chest pain, difficulty breathing, redness or excessive drainage around the incision, other concerns.   Please follow Dr. Aguirre’s instruction sheets     ACTIVITY:   - 50% Partial Weight bear on right leg with assistive device (crutches). No strenuous activity, heavy lifting, driving or returning to work until cleared by MD.   - Apply a cold compress to the surgical site several times daily to reduce pain and swelling. For icing, twenty-minute sessions followed by an hour off is recommended. You should ice as frequently as possible. Ice should NEVER be placed directly on the skin. Wearing compression stockings during the first week after surgery can help reduce swelling in your knee, calf and foot, but is not required.      DRESSING/SHOWERING: AQUACEL – brown gel dressing  - You may shower, your dressing is water-resistant. Do not soak in bathtubs. Remove dressing after postop day 7, then leave incision open to air. You may do this yourself (simply peel it off), or you may ask for assistance from your visiting nurse. Keep your incision clean and dry. Do not pick at your incision. Do not apply creams, ointments or oils to your incision until cleared by your surgeon. Do not soak your incision in sitting water (ie tubs, pools, lakes, etc.) until cleared by your surgeon. Do not scrub the incision – instead, allow soap and water to flow over the incision and then pat it dry with a clean towel.     MEDICATION/ANTICOAGULATION:   -You have been prescribed aspirin, as a preventative to help prevent postoperative blood clots. Please take this medication as prescribed.    - You have been prescribed medications for pain:     - Tylenol for mild to moderate pain. Do not exceed 3,000mg daily.     - For more severe pain, take Tylenol with the addition of narcotic pain medication. Take this medication as prescribed. This medication may cause drowsiness or dizziness. Do not operate machinery. This medication may cause constipation.   - For any additional medications, follow instructions on the bottle.    -Try to have regular bowel movements. Take stool softener or laxative if necessary. You may wish to take Miralax daily until you have regular bowel movements.    - If you have been prescribed Aspirin or an anti-inflammatory, please take prilosec (omeprazole) once a day, before breakfast, until no longer taking Aspirin or anti-inflammatory. This will help protect your stomach.   - If you have a pain management physician, please follow-up with them postoperatively.    - If you experience any negative side effects of your medications, please call your surgeon's office to discuss.      FOLLOW-UP:   - Call to schedule an appt with Dr. Aguirre  for follow up.   - Please follow-up with your primary care physician or any other specialist you see postoperatively, if needed.    - Contact your doctor or go to the emergency room if you experience: fever greater than 101.5, chills, chest pain, difficulty breathing, redness or excessive drainage around the incision, other concerns.   Please follow Dr. Aguirre’s instruction sheets     ACTIVITY:   - 50% Partial Weight bear on right leg with assistive device (crutches). No strenuous activity, heavy lifting, driving or returning to work until cleared by MD.   - Apply a cold compress to the surgical site several times daily to reduce pain and swelling. For icing, twenty-minute sessions followed by an hour off is recommended. You should ice as frequently as possible. Ice should NEVER be placed directly on the skin. Wearing compression stockings during the first week after surgery can help reduce swelling in your knee, calf and foot, but is not required.      DRESSING/SHOWERING: AQUACEL – brown gel dressing  - You may shower, your dressing is water-resistant. Do not soak in bathtubs. Remove dressing after postop day 7, then leave incision open to air. You may do this yourself (simply peel it off), or you may ask for assistance from your visiting nurse. Keep your incision clean and dry. Do not pick at your incision. Do not apply creams, ointments or oils to your incision until cleared by your surgeon. Do not soak your incision in sitting water (ie tubs, pools, lakes, etc.) until cleared by your surgeon. Do not scrub the incision – instead, allow soap and water to flow over the incision and then pat it dry with a clean towel.     MEDICATION/ANTICOAGULATION:   -You have been prescribed aspirin, as a preventative to help prevent postoperative blood clots. Please take this medication as prescribed.    - You have been prescribed medications for pain:     - Tylenol for mild to moderate pain. Do not exceed 3,000mg daily.     - For more severe pain, take Tylenol with the addition of narcotic pain medication. Take this medication as prescribed. This medication may cause drowsiness or dizziness. Do not operate machinery. This medication may cause constipation.   - For any additional medications, follow instructions on the bottle.    -Try to have regular bowel movements. Take stool softener or laxative if necessary. You may wish to take Miralax daily until you have regular bowel movements.    - If you have been prescribed Aspirin or an anti-inflammatory, please take prilosec (omeprazole) once a day, before breakfast, until no longer taking Aspirin or anti-inflammatory. This will help protect your stomach.   - If you have a pain management physician, please follow-up with them postoperatively.    - If you experience any negative side effects of your medications, please call your surgeon's office to discuss.    - You have been prescribed vitamin D for bone health   FOLLOW-UP:   - Call to schedule an appt with Dr. Aguirre  for follow up.   - Please follow-up with your primary care physician or any other specialist you see postoperatively, if needed.    - Contact your doctor or go to the emergency room if you experience: fever greater than 101.5, chills, chest pain, difficulty breathing, redness or excessive drainage around the incision, other concerns.

## 2023-06-09 NOTE — DISCHARGE NOTE PROVIDER - NSDCCPTREATMENT_GEN_ALL_CORE_FT
PRINCIPAL PROCEDURE  Procedure: ORIF, fracture, femur, neck, using sliding hip screw or compression hip screw  Findings and Treatment:

## 2023-06-09 NOTE — PROGRESS NOTE ADULT - SUBJECTIVE AND OBJECTIVE BOX
INTERVAL HPI/OVERNIGHT EVENTS: edison o/n    SUBJECTIVE: Patient seen and examined at bedside.   pt feels well overall. Pain is controlled. Felt slight lightheadedness while getting up last night which is improved today. No chest pain, dyspnea, dizziness. Eating wo N/V/Abd pain.    OBJECTIVE:    VITAL SIGNS:  ICU Vital Signs Last 24 Hrs  T(C): 37 (09 Jun 2023 09:00), Max: 37.1 (08 Jun 2023 14:31)  T(F): 98.6 (09 Jun 2023 09:00), Max: 98.7 (08 Jun 2023 20:29)  HR: 65 (09 Jun 2023 09:00) (54 - 78)  BP: 124/68 (09 Jun 2023 09:00) (96/60 - 124/68)  BP(mean): 84 (08 Jun 2023 18:41) (83 - 90)  ABP: --  ABP(mean): --  RR: 17 (09 Jun 2023 09:00) (12 - 19)  SpO2: 100% (09 Jun 2023 09:00) (94% - 100%)    O2 Parameters below as of 09 Jun 2023 09:00  Patient On (Oxygen Delivery Method): room air              06-08 @ 07:01  -  06-09 @ 07:00  --------------------------------------------------------  IN: 440 mL / OUT: 1500 mL / NET: -1060 mL      CAPILLARY BLOOD GLUCOSE          PHYSICAL EXAM:  GEN: female in NAD on RA  HEENT: NC/AT, MMM  CV: RRR, nml S1S2, no murmurs  PULM: nml effort, CTAB  ABD: Soft, non-distended, NABS, non-tender  NEURO  A/O x3, moving all extremities, Some decreased strength in R leg > L  R hip dressing c/d/i. 4/5 strength in R hip limited by pain. 5/5 in plantarflex/ext b/l   PSYCH: Appropriate    MEDICATIONS:  MEDICATIONS  (STANDING):  aspirin 325 milliGRAM(s) Oral daily  lactated ringers. 1000 milliLiter(s) (100 mL/Hr) IV Continuous <Continuous>  sertraline 200 milliGRAM(s) Oral at bedtime  verapamil  milliGRAM(s) Oral at bedtime    MEDICATIONS  (PRN):  acetaminophen     Tablet .. 650 milliGRAM(s) Oral every 6 hours PRN Temp greater or equal to 38C (100.4F), Mild Pain (1 - 3)  morphine  - Injectable 2 milliGRAM(s) IV Push every 4 hours PRN breakthrough  ondansetron Injectable 4 milliGRAM(s) IV Push every 6 hours PRN Nausea and/or Vomiting  oxyCODONE    IR 5 milliGRAM(s) Oral every 4 hours PRN Severe Pain (7 - 10)      ALLERGIES:  Allergies    Dilaudid (Other)    Intolerances        LABS:                        13.5   17.78 )-----------( 224      ( 09 Jun 2023 08:16 )             39.6     06-09    138  |  104  |  10  ----------------------------<  94  SEE NOTE   |  21<L>  |  0.55    Ca    9.5      09 Jun 2023 08:16    TPro  x   /  Alb  3.9  /  TBili  x   /  DBili  x   /  AST  x   /  ALT  x   /  AlkPhos  x   06-08    PT/INR - ( 08 Jun 2023 18:15 )   PT: 12.3 sec;   INR: 1.03          PTT - ( 08 Jun 2023 18:15 )  PTT:30.3 sec      RADIOLOGY & ADDITIONAL TESTS: Reviewed.

## 2023-06-09 NOTE — DISCHARGE NOTE PROVIDER - NSDCMRMEDTOKEN_GEN_ALL_CORE_FT
sertraline 200 mg oral capsule: 1 orally once a day (at bedtime)  verapamil 180 mg/24 hours oral tablet, extended release: 180 milligram(s) orally once a day (at bedtime)   acetaminophen 325 mg oral tablet: 2 tab(s) orally every 6 hours As needed Temp greater or equal to 38C (100.4F), Mild Pain (1 - 3)  Adult Aspirin 325 mg oral tablet: 1 tab(s) orally once a day  oxyCODONE 5 mg oral tablet: 1 tab(s) orally every 6 hours as needed for Severe Pain (7 - 10) MDD: 4  sertraline 200 mg oral capsule: 1 orally once a day (at bedtime)  verapamil 180 mg/24 hours oral tablet, extended release: 180 milligram(s) orally once a day (at bedtime)   acetaminophen 325 mg oral tablet: 2 tab(s) orally every 6 hours As needed Temp greater or equal to 38C (100.4F), Mild Pain (1 - 3)  Adult Aspirin 325 mg oral tablet: 1 tab(s) orally once a day  oxyCODONE 5 mg oral tablet: 1 tab(s) orally every 6 hours as needed for Severe Pain (7 - 10) MDD: 4  sertraline 200 mg oral capsule: 1 orally once a day (at bedtime)  verapamil 180 mg/24 hours oral tablet, extended release: 180 milligram(s) orally once a day (at bedtime)  Vitamin D3 50 mcg (2000 intl units) oral tablet: 1 tab(s) orally once a day MDD: 1

## 2023-06-09 NOTE — PROGRESS NOTE ADULT - SUBJECTIVE AND OBJECTIVE BOX
Ortho Progress Note    Procedure: RIGHT hip DHS  Surgeon: Dr. Oliveira    Pt pain controlled. Has tingling in RLE consistent with baseline.  Denies CP, SOB, N/V    Vital Signs Last 24 Hrs  T(C): 36.8 (09 Jun 2023 00:20), Max: 37.1 (08 Jun 2023 08:30)  T(F): 98.3 (09 Jun 2023 00:20), Max: 98.7 (08 Jun 2023 08:30)  HR: 61 (09 Jun 2023 00:20) (54 - 78)  BP: 96/60 (09 Jun 2023 00:20) (96/60 - 120/71)  BP(mean): 84 (08 Jun 2023 18:41) (83 - 90)  RR: 18 (09 Jun 2023 00:20) (12 - 19)  SpO2: 96% (09 Jun 2023 00:20) (96% - 100%)    Parameters below as of 09 Jun 2023 00:20  Patient On (Oxygen Delivery Method): room air      General: Pt Alert and oriented, NAD  DSG C/D/I aquacell  B/L LE: DP 2+, brisk cap refill, EHL/FHL/TA/GS firing bilaterally        A/P: 27yFemale s/p R Hip DHS for Nondisplaced FNF by Dr. Oliveira on 6/8  - Stable  - Pain Control  - DVT ppx: asa 325 q24  - Post op abx: ancef  - PT, WBS: 50% PWB  - PT Eval today    Ortho Pager [phone]

## 2023-06-09 NOTE — DISCHARGE NOTE NURSING/CASE MANAGEMENT/SOCIAL WORK - PATIENT PORTAL LINK FT
You can access the FollowMyHealth Patient Portal offered by [facility] by registering at the following website: [email or web address]. By joining [facility]’s FollowMyHealth portal, you will also be able to view your health information using other applications (apps) compatible with our system.

## 2023-06-09 NOTE — PHYSICAL THERAPY INITIAL EVALUATION ADULT - NEUROVASCULAR ASSESSMENT RLE
Patient reports 75% sensation in RLE. Occasional tingling in toes 1-5, as well./numbness present/tingling present/warm/no discoloration

## 2023-06-09 NOTE — DISCHARGE NOTE PROVIDER - HOSPITAL COURSE
Admitted: 6-7-2023  Surgery: s/p Right DHS   Lashaun-op Antibiotics:  Pain control  DVT prophylaxis:  OOB/Physical Therapy  Consultants:  Inpatient events:   Admitted: 6-7-2023  Surgery: s/p Right DHS   Lashaun-op Antibiotics: Ancef  Pain control  DVT prophylaxis: aspirin 325mg daily   OOB/Physical Therapy  Consultants: Medicine

## 2023-06-09 NOTE — DISCHARGE NOTE PROVIDER - CARE PROVIDER_API CALL
Bryon Aguirre  Orthopaedic Surgery  [address]  [place] [address]  Phone: [phone]  Fax: [phone]  Follow Up Time: 2 weeks

## 2023-06-09 NOTE — PHYSICAL THERAPY INITIAL EVALUATION ADULT - GENERAL OBSERVATIONS, REHAB EVAL
PT IE completed. Patient received semi supine in bed +heplock IV, +R hip dressing C/D/I, NAD, willing to work with PT.

## 2023-06-09 NOTE — PROGRESS NOTE ADULT - SUBJECTIVE AND OBJECTIVE BOX
Ortho Note    Subjective:  Pt seen and examined today. Patient reporting right hip and surgical site pain. Patient reporting right foot numbness and tingling since her fall, patient reports she had a history of a herniated disc that may be attributing to her foot numbness/.   Denies CP, SOB, N/V,   Reviewed plan of care with patient at bedside    Vital Signs Last 24 Hrs  T(C): 37 (06-09-23 @ 09:00), Max: 37 (06-09-23 @ 09:00)  T(F): 98.6 (06-09-23 @ 09:00), Max: 98.6 (06-09-23 @ 09:00)  HR: 65 (06-09-23 @ 09:00) (65 - 78)  BP: 124/68 (06-09-23 @ 09:00) (101/66 - 124/68)  BP(mean): --  RR: 17 (06-09-23 @ 09:00) (17 - 18)  SpO2: 100% (06-09-23 @ 09:00) (94% - 100%)  AVSS    Objective:    Physical Exam:  General: Pt Alert and oriented, NAD  Right hip aquacell with some slight staining dry and intact  Pulses: +2 pedal pulses, wwp toes  Sensation: Right LE numbness and tingling consistent with baseline- otherwise silt   Motor: EHL/FHL/TA/GS - 5/5 bilateral lower extremities        Plan of Care:  A/P: 27yFemale with a fracture of the femoral neck - s/p Right DHS 6-8  - afebrile  - Pain Control- tylenol 650mg PO Q6h prn mild pain, Oxycodone 5mg PO Q4h prn severe pain, morphine 2mg IVP Q4h prn breakthrough pain,   - DVT ppx: aspirin 325mg PO Daily  - PT, WBS: 50% PWB RLE  - appreciate medicine recs and clearance  - ambulate with pt as tolerated  - Dispo- home pending pt clearance    Ortho Pager [phone]

## 2023-06-09 NOTE — PHYSICAL THERAPY INITIAL EVALUATION ADULT - GAIT DEVIATIONS NOTED, PT EVAL
Demo fairly steady gait, no LOB observed, good navigation of hallway obstacles. Verbalizes and demos good understanding of PWB precautions./decreased vernon/decreased velocity of limb motion/decreased step length/decreased stride length/decreased weight-shifting ability

## 2023-06-09 NOTE — DISCHARGE NOTE NURSING/CASE MANAGEMENT/SOCIAL WORK - NSDCPEFALRISK_GEN_ALL_CORE
For information on Fall & Injury Prevention, visit: [email or web address] OR  [email or web address] OR  https://www.cdc.gov/steadi/patient.html

## 2023-06-09 NOTE — PHYSICAL THERAPY INITIAL EVALUATION ADULT - ADDITIONAL COMMENTS
Active community ambulator who lives alone in 3rd floor walk up apartment. Independent with all ADLs prior and denies use of AD when ambulating.

## 2023-06-10 VITALS
SYSTOLIC BLOOD PRESSURE: 106 MMHG | HEART RATE: 67 BPM | DIASTOLIC BLOOD PRESSURE: 72 MMHG | RESPIRATION RATE: 17 BRPM | TEMPERATURE: 98 F | OXYGEN SATURATION: 97 %

## 2023-06-10 LAB
ANION GAP SERPL CALC-SCNC: 11 MMOL/L — SIGNIFICANT CHANGE UP (ref 5–17)
APTT BLD: 31.4 SEC — SIGNIFICANT CHANGE UP (ref 27.5–35.5)
BUN SERPL-MCNC: 12 MG/DL — SIGNIFICANT CHANGE UP (ref 7–23)
CALCIUM SERPL-MCNC: 8.8 MG/DL — SIGNIFICANT CHANGE UP (ref 8.4–10.5)
CHLORIDE SERPL-SCNC: 107 MMOL/L — SIGNIFICANT CHANGE UP (ref 96–108)
CO2 SERPL-SCNC: 24 MMOL/L — SIGNIFICANT CHANGE UP (ref 22–31)
CREAT SERPL-MCNC: 0.64 MG/DL — SIGNIFICANT CHANGE UP (ref 0.5–1.3)
EGFR: 123 ML/MIN/1.73M2 — SIGNIFICANT CHANGE UP
GLUCOSE SERPL-MCNC: 92 MG/DL — SIGNIFICANT CHANGE UP (ref 70–99)
HCT VFR BLD CALC: 38.2 % — SIGNIFICANT CHANGE UP (ref 34.5–45)
HGB BLD-MCNC: 12.8 G/DL — SIGNIFICANT CHANGE UP (ref 11.5–15.5)
INR BLD: 0.99 — SIGNIFICANT CHANGE UP (ref 0.88–1.16)
MCHC RBC-ENTMCNC: 31.4 PG — SIGNIFICANT CHANGE UP (ref 27–34)
MCHC RBC-ENTMCNC: 33.5 GM/DL — SIGNIFICANT CHANGE UP (ref 32–36)
MCV RBC AUTO: 93.6 FL — SIGNIFICANT CHANGE UP (ref 80–100)
NRBC # BLD: 0 /100 WBCS — SIGNIFICANT CHANGE UP (ref 0–0)
PLATELET # BLD AUTO: 257 K/UL — SIGNIFICANT CHANGE UP (ref 150–400)
POTASSIUM SERPL-MCNC: 3.7 MMOL/L — SIGNIFICANT CHANGE UP (ref 3.5–5.3)
POTASSIUM SERPL-SCNC: 3.7 MMOL/L — SIGNIFICANT CHANGE UP (ref 3.5–5.3)
PROTHROM AB SERPL-ACNC: 11.8 SEC — SIGNIFICANT CHANGE UP (ref 10.5–13.4)
RBC # BLD: 4.08 M/UL — SIGNIFICANT CHANGE UP (ref 3.8–5.2)
RBC # FLD: 11.9 % — SIGNIFICANT CHANGE UP (ref 10.3–14.5)
SODIUM SERPL-SCNC: 142 MMOL/L — SIGNIFICANT CHANGE UP (ref 135–145)
WBC # BLD: 10.93 K/UL — HIGH (ref 3.8–10.5)
WBC # FLD AUTO: 10.93 K/UL — HIGH (ref 3.8–10.5)

## 2023-06-10 PROCEDURE — 85610 PROTHROMBIN TIME: CPT

## 2023-06-10 PROCEDURE — C1713: CPT

## 2023-06-10 PROCEDURE — 80048 BASIC METABOLIC PNL TOTAL CA: CPT

## 2023-06-10 PROCEDURE — 84702 CHORIONIC GONADOTROPIN TEST: CPT

## 2023-06-10 PROCEDURE — 82310 ASSAY OF CALCIUM: CPT

## 2023-06-10 PROCEDURE — 73502 X-RAY EXAM HIP UNI 2-3 VIEWS: CPT

## 2023-06-10 PROCEDURE — 76000 FLUOROSCOPY <1 HR PHYS/QHP: CPT

## 2023-06-10 PROCEDURE — 72170 X-RAY EXAM OF PELVIS: CPT

## 2023-06-10 PROCEDURE — 99231 SBSQ HOSP IP/OBS SF/LOW 25: CPT

## 2023-06-10 PROCEDURE — 84443 ASSAY THYROID STIM HORMONE: CPT

## 2023-06-10 PROCEDURE — 73552 X-RAY EXAM OF FEMUR 2/>: CPT

## 2023-06-10 PROCEDURE — C1769: CPT

## 2023-06-10 PROCEDURE — 85025 COMPLETE CBC W/AUTO DIFF WBC: CPT

## 2023-06-10 PROCEDURE — 71045 X-RAY EXAM CHEST 1 VIEW: CPT

## 2023-06-10 PROCEDURE — 86900 BLOOD TYPING SEROLOGIC ABO: CPT

## 2023-06-10 PROCEDURE — 72170 X-RAY EXAM OF PELVIS: CPT | Mod: 26

## 2023-06-10 PROCEDURE — 86850 RBC ANTIBODY SCREEN: CPT

## 2023-06-10 PROCEDURE — 82040 ASSAY OF SERUM ALBUMIN: CPT

## 2023-06-10 PROCEDURE — 85730 THROMBOPLASTIN TIME PARTIAL: CPT

## 2023-06-10 PROCEDURE — 85027 COMPLETE CBC AUTOMATED: CPT

## 2023-06-10 PROCEDURE — 81025 URINE PREGNANCY TEST: CPT

## 2023-06-10 PROCEDURE — 36415 COLL VENOUS BLD VENIPUNCTURE: CPT

## 2023-06-10 PROCEDURE — 99285 EMERGENCY DEPT VISIT HI MDM: CPT

## 2023-06-10 PROCEDURE — 82306 VITAMIN D 25 HYDROXY: CPT

## 2023-06-10 PROCEDURE — 86901 BLOOD TYPING SEROLOGIC RH(D): CPT

## 2023-06-10 PROCEDURE — 97161 PT EVAL LOW COMPLEX 20 MIN: CPT

## 2023-06-10 PROCEDURE — 97116 GAIT TRAINING THERAPY: CPT

## 2023-06-10 RX ORDER — ASPIRIN/CALCIUM CARB/MAGNESIUM 324 MG
1 TABLET ORAL
Qty: 27 | Refills: 0
Start: 2023-06-10 | End: 2023-07-06

## 2023-06-10 RX ORDER — CHOLECALCIFEROL (VITAMIN D3) 125 MCG
1 CAPSULE ORAL
Qty: 30 | Refills: 2
Start: 2023-06-10 | End: 2023-09-07

## 2023-06-10 RX ORDER — ACETAMINOPHEN 500 MG
2 TABLET ORAL
Qty: 0 | Refills: 0 | DISCHARGE
Start: 2023-06-10

## 2023-06-10 RX ORDER — OXYCODONE HYDROCHLORIDE 5 MG/1
1 TABLET ORAL
Qty: 20 | Refills: 0
Start: 2023-06-10 | End: 2023-06-14

## 2023-06-10 RX ADMIN — OXYCODONE HYDROCHLORIDE 5 MILLIGRAM(S): 5 TABLET ORAL at 06:29

## 2023-06-10 RX ADMIN — OXYCODONE HYDROCHLORIDE 5 MILLIGRAM(S): 5 TABLET ORAL at 05:29

## 2023-06-10 RX ADMIN — Medication 325 MILLIGRAM(S): at 12:40

## 2023-06-10 RX ADMIN — OXYCODONE HYDROCHLORIDE 5 MILLIGRAM(S): 5 TABLET ORAL at 14:21

## 2023-06-10 RX ADMIN — Medication 650 MILLIGRAM(S): at 09:09

## 2023-06-10 NOTE — PROGRESS NOTE ADULT - SUBJECTIVE AND OBJECTIVE BOX
INTERVAL HPI/OVERNIGHT EVENTS: edison o/n    SUBJECTIVE: Patient seen and examined at bedside.   Ambulating w PT. No LH/DIzziness. Overall feels well and more confident to return home. Denies any chest pain, dyspnea, N/V/Abd pain. Voiding. Pain controlled in leg.'    OBJECTIVE:    VITAL SIGNS:  ICU Vital Signs Last 24 Hrs  T(C): 36.8 (10 Ben 2023 09:04), Max: 36.8 (10 Ben 2023 09:04)  T(F): 98.3 (10 Ben 2023 09:04), Max: 98.3 (10 Ben 2023 09:04)  HR: 67 (10 Ben 2023 09:04) (67 - 86)  BP: 106/72 (10 Ben 2023 09:04) (99/69 - 128/83)  BP(mean): 84 (10 Ben 2023 09:04) (84 - 84)  ABP: --  ABP(mean): --  RR: 17 (10 Ben 2023 09:04) (16 - 18)  SpO2: 97% (10 Ben 2023 09:04) (95% - 100%)    O2 Parameters below as of 10 Ben 2023 09:04  Patient On (Oxygen Delivery Method): room air              06-09 @ 07:01  -  06-10 @ 07:00  --------------------------------------------------------  IN: 600 mL / OUT: 1200 mL / NET: -600 mL      CAPILLARY BLOOD GLUCOSE          PHYSICAL EXAM:  GEN: female in NAD on RA  HEENT: NC/AT, MMM  CV: RRR, nml S1S2, no murmurs  PULM: nml effort, CTAB  ABD: Soft, non-distended, NABS, non-tender  NEURO  A/O x3, moving all extremities, Some decreased strength in R leg > L  R hip dressing c/d/i. 4/5 strength in R hip limited by pain. 5/5 in plantarflex/ext b/l   PSYCH: Appropriate    MEDICATIONS:  MEDICATIONS  (STANDING):  aspirin 325 milliGRAM(s) Oral daily  sertraline 200 milliGRAM(s) Oral at bedtime  verapamil  milliGRAM(s) Oral at bedtime    MEDICATIONS  (PRN):  acetaminophen     Tablet .. 650 milliGRAM(s) Oral every 6 hours PRN Temp greater or equal to 38C (100.4F), Mild Pain (1 - 3)  morphine  - Injectable 2 milliGRAM(s) IV Push every 4 hours PRN breakthrough  ondansetron Injectable 4 milliGRAM(s) IV Push every 6 hours PRN Nausea and/or Vomiting  oxyCODONE    IR 5 milliGRAM(s) Oral every 4 hours PRN Severe Pain (7 - 10)      ALLERGIES:  Allergies    Dilaudid (Other)    Intolerances        LABS:                        12.8   10.93 )-----------( 257      ( 10 Ben 2023 05:30 )             38.2     06-10    142  |  107  |  12  ----------------------------<  92  3.7   |  24  |  0.64    Ca    8.8      10 Ben 2023 05:30      PT/INR - ( 10 Ben 2023 05:30 )   PT: 11.8 sec;   INR: 0.99          PTT - ( 10 Ben 2023 05:30 )  PTT:31.4 sec      RADIOLOGY & ADDITIONAL TESTS: Reviewed.

## 2023-06-10 NOTE — PROGRESS NOTE ADULT - ASSESSMENT
PCP: Dr. Miguelina Hwang - [facility]  27F w PCOS, Migraines, p/w pain in R hip after hiking 5/20, subsequent fall down stairs after being pulled by puppy, found to have closed R femoral neck fx, for R hip dynamic hip screw w Dr. Oliveira 6/8    #Pre-op evaluation  METS:  >4  EKG: Sinus bradycardia wo ST/T wave changes  RCRI: Class I risk  THOMPSON: <0.1% risk of MI or cardiac arrest intraop or up to 30d post-op    #R femoral neck fx - mgmt per orthopedics  #Migraines - on verapamil 180mg HS  #Depression - home on zoloft 200mg HS  #Leukocytosis - 11 yesterday. Likely reactive. Afebrile and nontoxic appearing.     Recommmend  Overall, pt has good functional status and is low risk for intermediate risk procedure. Optimized for OR.     Would restart PPx as soon as post-op bleeding risk allows    DISPO: TBD - pending OR, PT post-op. Pt lives in Walk-up apartment 3rd floor    
PCP: Dr. Miguelina Hwang - [facility]  27F w PCOS, Migraines, p/w pain in R hip after hiking 5/20, subsequent fall down stairs after being pulled by puppy, found to have closed R femoral neck fx, s/p R hip dynamic hip screw w Dr. Oliveira 6/8    #Post-op state - pain controlled. PPx:  per ortho. On bowel regimen and incentive spirometer  #R femoral neck fx - mgmt per orthopedics    #Vitamin D insufficiency. Pt reports taking prenatal vitamins sporadically.   - 25,OH-D level 22.8. Start 2000 IU vitamin D daily. f/u PCP to recheck 25,OH-D as outpatient    #Migraines - on verapamil 180mg HS  #Depression - home on zoloft 200mg HS  #Leukocytosis - improving. 10.9 today. Likely reactive. Pt afebrile and non toxic appearing. Continue to monitor.    Recommmend  Overall doing well. Optimized  for disposition once clears PT.   Can start 2000 IU vitamin D daily. Follow-up w PCP to recheck vitamin D outpatient.     DISPO: HPT pending clearing stairs. Pt lives in Walk-up apartment 3rd floor  
PCP: Dr. Miguelina Hwang - [facility]  27F w PCOS, Migraines, p/w pain in R hip after hiking 5/20, subsequent fall down stairs after being pulled by puppy, found to have closed R femoral neck fx, s/p R hip dynamic hip screw w Dr. Oliveira 6/8    #Post-op state - pain controlled. PPx:  per ortho. On bowel regimen and incentive spirometer  #R femoral neck fx - mgmt per orthopedics    #Vitamin D insufficiency. Pt reports taking prenatal vitamins sporadically.   - 25,OH-D level 22.8    #Migraines - on verapamil 180mg HS  #Depression - home on zoloft 200mg HS  #Leukocytosis - 17 today. Likely reactive. Pt afebrile and non toxic appearing. Continue to monitor.    Recommmend  Can start 2000 IU vitamin D daily. Follow-up w PCP to recheck vitamin D outpatient.     DISPO: HPT pending clearing stairs. Pt lives in Walk-up apartment 3rd floor

## 2023-06-10 NOTE — PROGRESS NOTE ADULT - REASON FOR ADMISSION
Right Closed Femoral Neck Fracture

## (undated) DEVICE — VENODYNE/SCD SLEEVE CALF MEDIUM

## (undated) DEVICE — WARMING BLANKET LOWER ADULT

## (undated) DEVICE — DRAPE C ARM 41X74"

## (undated) DEVICE — PACK TOTAL HIP

## (undated) DEVICE — DRILL BIT STRYKER 3.1X216MM